# Patient Record
Sex: FEMALE | URBAN - METROPOLITAN AREA
[De-identification: names, ages, dates, MRNs, and addresses within clinical notes are randomized per-mention and may not be internally consistent; named-entity substitution may affect disease eponyms.]

---

## 2018-04-30 ENCOUNTER — APPOINTMENT (RX ONLY)
Dept: URBAN - METROPOLITAN AREA CLINIC 56 | Facility: CLINIC | Age: 68
Setting detail: DERMATOLOGY
End: 2018-04-30

## 2018-04-30 DIAGNOSIS — L30.8 OTHER SPECIFIED DERMATITIS: ICD-10-CM

## 2018-04-30 PROBLEM — M12.9 ARTHROPATHY, UNSPECIFIED: Status: ACTIVE | Noted: 2018-04-30

## 2018-04-30 PROCEDURE — ? ADDITIONAL NOTES

## 2018-04-30 PROCEDURE — ? COUNSELING

## 2018-04-30 PROCEDURE — ? PRESCRIPTION

## 2018-04-30 PROCEDURE — ? TREATMENT REGIMEN

## 2018-04-30 PROCEDURE — 99202 OFFICE O/P NEW SF 15 MIN: CPT

## 2018-04-30 RX ORDER — TRIAMCINOLONE ACETONIDE 1 MG/G
OINTMENT TOPICAL
Qty: 1 | Refills: 0 | Status: ERX | COMMUNITY
Start: 2018-04-30

## 2018-04-30 RX ADMIN — TRIAMCINOLONE ACETONIDE: 1 OINTMENT TOPICAL at 00:00

## 2018-04-30 ASSESSMENT — LOCATION SIMPLE DESCRIPTION DERM
LOCATION SIMPLE: RIGHT HAND
LOCATION SIMPLE: LEFT HAND

## 2018-04-30 ASSESSMENT — LOCATION DETAILED DESCRIPTION DERM
LOCATION DETAILED: RIGHT RADIAL PALM
LOCATION DETAILED: LEFT ULNAR PALM

## 2018-04-30 ASSESSMENT — LOCATION ZONE DERM: LOCATION ZONE: HAND

## 2018-04-30 NOTE — PROCEDURE: TREATMENT REGIMEN
Detail Level: Simple
Samples Given: Ceravé wash, vanicream cream
Discontinue Regimen: Scented lotions for hands

## 2018-04-30 NOTE — PROCEDURE: ADDITIONAL NOTES
Additional Notes: Right palm worse than left palm\\nHas noticed improvement in the past when using Vaseline and gloves at night \\nGiven samples for pocket at work: cerave hydrating cleanser and vanicream.

## 2018-05-21 ENCOUNTER — APPOINTMENT (RX ONLY)
Dept: URBAN - METROPOLITAN AREA CLINIC 56 | Facility: CLINIC | Age: 68
Setting detail: DERMATOLOGY
End: 2018-05-21

## 2018-05-21 DIAGNOSIS — L30.8 OTHER SPECIFIED DERMATITIS: ICD-10-CM | Status: IMPROVED

## 2018-05-21 PROCEDURE — ? ADDITIONAL NOTES

## 2018-05-21 PROCEDURE — ? PRESCRIPTION

## 2018-05-21 PROCEDURE — ? COUNSELING

## 2018-05-21 PROCEDURE — 99213 OFFICE O/P EST LOW 20 MIN: CPT

## 2018-05-21 RX ORDER — CLOBETASOL PROPIONATE 0.5 MG/G
OINTMENT TOPICAL
Qty: 1 | Refills: 2 | Status: ERX | COMMUNITY
Start: 2018-05-21

## 2018-05-21 RX ADMIN — CLOBETASOL PROPIONATE: 0.5 OINTMENT TOPICAL at 00:00

## 2018-05-21 ASSESSMENT — LOCATION DETAILED DESCRIPTION DERM
LOCATION DETAILED: LEFT RADIAL PALM
LOCATION DETAILED: RIGHT RADIAL PALM

## 2018-05-21 ASSESSMENT — LOCATION ZONE DERM: LOCATION ZONE: HAND

## 2018-05-21 ASSESSMENT — LOCATION SIMPLE DESCRIPTION DERM
LOCATION SIMPLE: LEFT HAND
LOCATION SIMPLE: RIGHT HAND

## 2018-05-21 NOTE — PROCEDURE: ADDITIONAL NOTES
Additional Notes: Patient believes it is the bags she is coming into contact with at work ([employer]) that is causing the inflammation.\\nShe is wearing latex gloves at work now to avoid touching the bags. She will start using generous amounts of moisturizer cream under the gloves while at work.

## 2018-06-11 ENCOUNTER — APPOINTMENT (RX ONLY)
Dept: URBAN - METROPOLITAN AREA CLINIC 56 | Facility: CLINIC | Age: 68
Setting detail: DERMATOLOGY
End: 2018-06-11

## 2018-06-11 DIAGNOSIS — L30.8 OTHER SPECIFIED DERMATITIS: ICD-10-CM | Status: IMPROVED

## 2018-06-11 PROCEDURE — ? TREATMENT REGIMEN

## 2018-06-11 PROCEDURE — 99213 OFFICE O/P EST LOW 20 MIN: CPT

## 2018-06-11 PROCEDURE — ? COUNSELING

## 2018-06-11 ASSESSMENT — LOCATION SIMPLE DESCRIPTION DERM
LOCATION SIMPLE: RIGHT HAND
LOCATION SIMPLE: LEFT HAND

## 2018-06-11 ASSESSMENT — LOCATION DETAILED DESCRIPTION DERM
LOCATION DETAILED: RIGHT ULNAR PALM
LOCATION DETAILED: LEFT ULNAR PALM

## 2018-06-11 ASSESSMENT — LOCATION ZONE DERM: LOCATION ZONE: HAND

## 2018-06-11 NOTE — PROCEDURE: TREATMENT REGIMEN
Modify Regimen: Clobetasol ointment overnight with gloves QOD alternating with Vaseline x 1week, then clobetosol overnight TIW with Vaseline overnight every day between x 1 week, then Vaseline occlusion with gloves every night.
Detail Level: Simple